# Patient Record
Sex: MALE | URBAN - METROPOLITAN AREA
[De-identification: names, ages, dates, MRNs, and addresses within clinical notes are randomized per-mention and may not be internally consistent; named-entity substitution may affect disease eponyms.]

---

## 2021-10-30 ENCOUNTER — HOSPITAL ENCOUNTER (EMERGENCY)
Facility: MEDICAL CENTER | Age: 36
End: 2021-10-30

## 2021-10-30 VITALS
SYSTOLIC BLOOD PRESSURE: 135 MMHG | DIASTOLIC BLOOD PRESSURE: 91 MMHG | RESPIRATION RATE: 17 BRPM | OXYGEN SATURATION: 93 % | TEMPERATURE: 98.4 F | WEIGHT: 209.66 LBS | HEIGHT: 72 IN | HEART RATE: 118 BPM | BODY MASS INDEX: 28.4 KG/M2

## 2021-10-30 PROCEDURE — 302449 STATCHG TRIAGE ONLY (STATISTIC)
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
## 2021-10-30 NOTE — ED NOTES
PT irate and stated that he was going to the hospital near his home. PT swearing at staff on the way out. PT ambulatory out front door and refused to sign AMA form. AMA form filled out by ER staff and dismissed.